# Patient Record
Sex: MALE | Race: WHITE | NOT HISPANIC OR LATINO | Employment: FULL TIME | URBAN - NONMETROPOLITAN AREA
[De-identification: names, ages, dates, MRNs, and addresses within clinical notes are randomized per-mention and may not be internally consistent; named-entity substitution may affect disease eponyms.]

---

## 2017-12-24 ENCOUNTER — HOSPITAL ENCOUNTER (EMERGENCY)
Facility: HOSPITAL | Age: 42
Discharge: HOME | End: 2017-12-24
Attending: EMERGENCY MEDICINE | Admitting: FAMILY MEDICINE

## 2017-12-24 VITALS
DIASTOLIC BLOOD PRESSURE: 92 MMHG | OXYGEN SATURATION: 96 % | SYSTOLIC BLOOD PRESSURE: 127 MMHG | BODY MASS INDEX: 30.31 KG/M2 | HEART RATE: 98 BPM | TEMPERATURE: 98 F | HEIGHT: 68 IN | WEIGHT: 200 LBS | RESPIRATION RATE: 16 BRPM

## 2017-12-24 DIAGNOSIS — F41.9 ANXIETY: Primary | ICD-10-CM

## 2017-12-24 DIAGNOSIS — Z78.9 HAS RUN OUT OF MEDICATIONS: ICD-10-CM

## 2017-12-24 PROCEDURE — 99282 EMERGENCY DEPT VISIT SF MDM: CPT

## 2017-12-24 RX ORDER — VENLAFAXINE HYDROCHLORIDE 150 MG/1
150 CAPSULE, EXTENDED RELEASE ORAL DAILY
Qty: 30 CAPSULE | Refills: 0 | Status: SHIPPED | OUTPATIENT
Start: 2017-12-24 | End: 2018-01-23

## 2017-12-24 NOTE — ED PROVIDER NOTES
"Subjective   HPI Comments: Patient presents to emergency department for refill of Effexor.  States he recently moved from [place] and did not realize his medication did not have refills on it.  States he is having a headache and shakes.  Patient brought empty prescription bottle of Effexor XR 150mg once a day.  No other health concerns today.        History provided by:  Patient   used: No        Review of Systems   Constitutional: Negative for chills and fever.   Eyes: Negative for visual disturbance.   Respiratory: Negative for chest tightness and shortness of breath.    Cardiovascular: Negative for chest pain.   Gastrointestinal: Negative for abdominal pain.   Musculoskeletal: Negative for neck stiffness.   Skin: Negative for color change.   Neurological: Positive for dizziness and headaches. Negative for tremors, syncope, facial asymmetry, speech difficulty and weakness.   Psychiatric/Behavioral: Negative for confusion and dysphoric mood.       History reviewed. No pertinent past medical history.    Allergies   Allergen Reactions   • Latex    • Penicillins        History reviewed. No pertinent surgical history.    No family history on file.    Social History     Social History   • Marital status: Single     Spouse name: N/A   • Number of children: N/A   • Years of education: N/A     Social History Main Topics   • Smoking status: None   • Smokeless tobacco: None   • Alcohol use None   • Drug use: None   • Sexual activity: Not Asked     Other Topics Concern   • None     Social History Narrative   • None           Objective      /92 (BP Location: Right arm, Patient Position: Sitting)  Pulse 98  Temp 98 °F (36.7 °C) (Oral)   Resp 16  Ht 172.7 cm (68\")  Wt 90.7 kg (200 lb)  SpO2 96%  BMI 30.41 kg/m2    Physical Exam   Constitutional: He is oriented to person, place, and time. He appears well-developed and well-nourished. No distress.   Cardiovascular: Normal rate, regular rhythm and " normal heart sounds.    Pulmonary/Chest: Effort normal and breath sounds normal.   Abdominal: Soft. Bowel sounds are normal.   Neurological: He is alert and oriented to person, place, and time.   Skin: Skin is warm and dry.   Psychiatric: He has a normal mood and affect. His behavior is normal. Thought content normal.   Nursing note and vitals reviewed.      Procedures         ED Course  ED Course                  MDM    Final diagnoses:   Anxiety   Has run out of medications            Lucian Covarrubias PA-C  12/24/17 [id number]

## 2017-12-27 ENCOUNTER — TELEPHONE (OUTPATIENT)
Dept: FAMILY MEDICINE CLINIC | Facility: CLINIC | Age: 42
End: 2017-12-27